# Patient Record
Sex: FEMALE | Race: WHITE
[De-identification: names, ages, dates, MRNs, and addresses within clinical notes are randomized per-mention and may not be internally consistent; named-entity substitution may affect disease eponyms.]

---

## 2017-10-11 ENCOUNTER — HOSPITAL ENCOUNTER (OUTPATIENT)
Dept: HOSPITAL 46 - OPS | Age: 47
Discharge: HOME | End: 2017-10-11
Attending: OBSTETRICS & GYNECOLOGY
Payer: COMMERCIAL

## 2017-10-11 VITALS — WEIGHT: 150 LBS | BODY MASS INDEX: 27.6 KG/M2 | HEIGHT: 62 IN

## 2017-10-11 DIAGNOSIS — Z98.890: ICD-10-CM

## 2017-10-11 DIAGNOSIS — N85.8: Primary | ICD-10-CM

## 2017-10-11 DIAGNOSIS — Z90.49: ICD-10-CM

## 2017-10-11 DIAGNOSIS — K21.9: ICD-10-CM

## 2017-10-11 DIAGNOSIS — Z87.891: ICD-10-CM

## 2017-10-11 PROCEDURE — 0UDB8ZX EXTRACTION OF ENDOMETRIUM, VIA NATURAL OR ARTIFICIAL OPENING ENDOSCOPIC, DIAGNOSTIC: ICD-10-PCS | Performed by: OBSTETRICS & GYNECOLOGY

## 2017-10-11 PROCEDURE — 0UB98ZX EXCISION OF UTERUS, VIA NATURAL OR ARTIFICIAL OPENING ENDOSCOPIC, DIAGNOSTIC: ICD-10-PCS | Performed by: OBSTETRICS & GYNECOLOGY

## 2017-11-15 NOTE — OR
[facility]
                                    [address], [place]  [address]
_________________________________________________________________________________________
                                                                 Signed   
 
 
DATE OF PROCEDURE:  10/11/17 
 
SURGEON:  Shruti Murdock MD
 
PREOPERATIVE DIAGNOSES:  Endometrial mass, right labial mass. 
 
POSTOPERATIVE DIAGNOSES 
Endometrial mass, right labial mass, probable endometrial fibroid or submucous fibroid
and sebaceous cyst of the right labia.
 
PROCEDURES 
Hysteroscopy, resection of endometrial mass, excision of labial mass.
 
ANESTHESIA:  MAC.
 
ESTIMATED BLOOD LOSS:  10 mL.
 
DRAINS:  None.
 
INDICATIONS AND FINDINGS 
The patient is a 46-year-old female,  5, para 3, ectopic 1, SAB 1 who was found
to have an endometrial mass on evaluation for abdominal discomfort and bloating. She has
been using the Mirena for birth control and has had minimal bleeding. The IUD appeared
to be appropriately placed, but the endometrial cavity appeared to have a significant
mass with blood flow. She also has had a right lower labial mass for the last several
years and she desired this to be removed as well. At the time of surgery, exam under
anesthesia revealed a normal-size uterus. The IUD strings were visible. On inspecting
the cavity of the uterus, it appeared to be atrophic other than a large polypoid mass
which is fairly firm consistent with a probable fibroid. She had approximately 2 cm mass
in the right lower labia which was filled with sebaceous material and completely
excised.
 
PROCEDURE IN DETAIL 
The patient was prepped and draped in the dorsal lithotomy position. A weighted speculum
was placed and the anterior lip of the cervix was visualized and grasped with a
single-tooth tenaculum. The Mirena IUD was removed and placed on the back table for safe
keeping. The endometrial cavity was sounded to 9 cm. The endocervical canal was then
dilated to a #8 dilator. The MyoSure device was placed using saline as a medium. The
cavity was evaluated and a large polyp was seen. Following this, the MyoSure reach was
introduced and this mass was removed almost completely, though it got to the point where
it was very difficult to actually remove the remaining sections as the mass was fairly
floppy. Following this, the device was removed from the uterus and polyp forceps were
 
    Electronically Signed By: SHRUTI MURDOCK MD  11/15/17 0752
_________________________________________________________________________________________
PATIENT NAME:     VICTOR M LOAIZA                     
MEDICAL RECORD #: [id number]                     OPERATIVE REPORT              
          ACCT #: [id number]  
DATE OF BIRTH:   [birth year]                                       
PHYSICIAN:   SHRUTI MURDOCK MD                 REPORT #: [id number]
REPORT IS CONFIDENTIAL AND NOT TO BE RELEASED WITHOUT AUTHORIZATION
 
 
                                  [facility]
                                    [address], [place]  [address]
_________________________________________________________________________________________
                                                                 Signed   
 
 
introduced and polyp fragments were removed from the uterus. The cavity was reexamined
and there appeared to be a free-floating area in the cavity consistent with the polyp.
The MyoSure was removed again and polyp forceps were placed and this part was removed as
well. The cavity was then reinspected and appeared to have a complete removal of the
endometrial mass. There was no evidence of excessive bleeding from the base. The flow
was turned down and there was no evidence of significant blood loss and this portion of
the procedure was then terminated. The speculum was removed as was the tenaculum.
However, prior to this, the IUD which had been previously removed was replaced using the
polyp forceps. The string was visible at the conclusion. Following this, the perineal
mass was removed. The area around the mass was injected with 0.5% Marcaine with Epi. An
incision was made overlying the cyst with a knife. At that point, sebaceous material
began draining. This was completely drained and the capsule of the cyst was grasped with
an Allis clamp and was excised from the overlying skin with sharp dissection. This was
not sent to Pathology. Following this, the base of the defect was treated with cautery.
Deep sutures of 2-0 Vicryl were placed. Following this, skin incisions were
reapproximated with interrupted sutures of 3-0 Vicryl. All sponge and needle counts were
correct. She tolerated the procedure well, was taken to the recovery room in good
condition. 
 
 
 
 
_____________________________
Shruti Murdock MD
 
PJW/Modl
DD: 10/11/2017 08:35:58
DT: 10/11/2017 18:46:31
Job #: [id number]
 
cc:  Tom Caldwell MD
 
 
 
 
 
 
 
 
 
 
 
 
    Electronically Signed By: SHRUTI MURDOCK MD  11/15/17 0752
_________________________________________________________________________________________
PATIENT NAME:     VICTOR M LOAIZA                     
MEDICAL RECORD #: [id number]                     OPERATIVE REPORT              
          ACCT #: [id number]  
DATE OF BIRTH:   [birth year]                                       
PHYSICIAN:   SHRUTI MURDOCK MD                 REPORT #: [id number]
REPORT IS CONFIDENTIAL AND NOT TO BE RELEASED WITHOUT AUTHORIZATION

## 2018-06-08 ENCOUNTER — HOSPITAL ENCOUNTER (EMERGENCY)
Dept: HOSPITAL 46 - ED | Age: 48
Discharge: HOME | End: 2018-06-08
Payer: COMMERCIAL

## 2018-06-08 VITALS — BODY MASS INDEX: 25.4 KG/M2 | HEIGHT: 62 IN | WEIGHT: 138.01 LBS

## 2018-06-08 DIAGNOSIS — Z79.899: ICD-10-CM

## 2018-06-08 DIAGNOSIS — S92.421A: Primary | ICD-10-CM
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
## 2018-06-08 DIAGNOSIS — Z87.891: ICD-10-CM

## 2018-06-08 PROCEDURE — 3E0T3BZ INTRODUCTION OF ANESTHETIC AGENT INTO PERIPHERAL NERVES AND PLEXI, PERCUTANEOUS APPROACH: ICD-10-PCS
